# Patient Record
Sex: MALE | Race: ASIAN | NOT HISPANIC OR LATINO | URBAN - METROPOLITAN AREA
[De-identification: names, ages, dates, MRNs, and addresses within clinical notes are randomized per-mention and may not be internally consistent; named-entity substitution may affect disease eponyms.]

---

## 2022-11-02 ENCOUNTER — EMERGENCY (EMERGENCY)
Facility: HOSPITAL | Age: 52
LOS: 0 days | Discharge: HOME | End: 2022-11-02
Attending: EMERGENCY MEDICINE | Admitting: EMERGENCY MEDICINE

## 2022-11-02 VITALS
OXYGEN SATURATION: 95 % | WEIGHT: 184.97 LBS | HEART RATE: 74 BPM | SYSTOLIC BLOOD PRESSURE: 152 MMHG | RESPIRATION RATE: 18 BRPM | TEMPERATURE: 99 F | DIASTOLIC BLOOD PRESSURE: 84 MMHG

## 2022-11-02 DIAGNOSIS — Z23 ENCOUNTER FOR IMMUNIZATION: ICD-10-CM

## 2022-11-02 DIAGNOSIS — S61.411A LACERATION WITHOUT FOREIGN BODY OF RIGHT HAND, INITIAL ENCOUNTER: ICD-10-CM
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
## 2022-11-02 DIAGNOSIS — E11.9 TYPE 2 DIABETES MELLITUS WITHOUT COMPLICATIONS: ICD-10-CM
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
## 2022-11-02 PROCEDURE — 12002 RPR S/N/AX/GEN/TRNK2.6-7.5CM: CPT

## 2022-11-02 PROCEDURE — 99283 EMERGENCY DEPT VISIT LOW MDM: CPT | Mod: 25

## 2022-11-02 RX ORDER — TETANUS TOXOID, REDUCED DIPHTHERIA TOXOID AND ACELLULAR PERTUSSIS VACCINE, ADSORBED 5; 2.5; 8; 8; 2.5 [IU]/.5ML; [IU]/.5ML; UG/.5ML; UG/.5ML; UG/.5ML
0.5 SUSPENSION INTRAMUSCULAR ONCE
Refills: 0 | Status: COMPLETED | OUTPATIENT
Start: 2022-11-02 | End: 2022-11-02

## 2022-11-02 RX ORDER — CEPHALEXIN 500 MG
1 CAPSULE ORAL
Qty: 28 | Refills: 0
Start: 2022-11-02 | End: 2022-11-08

## 2022-11-02 RX ADMIN — TETANUS TOXOID, REDUCED DIPHTHERIA TOXOID AND ACELLULAR PERTUSSIS VACCINE, ADSORBED 0.5 MILLILITER(S): 5; 2.5; 8; 8; 2.5 SUSPENSION INTRAMUSCULAR at 14:54

## 2022-11-02 NOTE — ED PROVIDER NOTE - ATTENDING APP SHARED VISIT CONTRIBUTION OF CARE
51 yo m with pmh of dm, presents with R hand injury.  was at work, cut by porcelain tile.  no difficulty with /strength exam: R hand palmar laceration between 3rd and 4th finger, no tendon involvement, normal sensation, +cap refill, good finger flexion imp: pt with palmar lac, repair, tdap, abx

## 2022-11-02 NOTE — ED PROVIDER NOTE - PATIENT PORTAL LINK FT
You can access the FollowMyHealth Patient Portal offered by [facility] by registering at the following website: [email or web address]. By joining [facility]’s FollowMyHealth portal, you will also be able to view your health information using other applications (apps) compatible with our system.

## 2022-11-02 NOTE — ED ADULT TRIAGE NOTE - WEIGHT IN LBS
----- Message from Ferdinand Doll MD sent at 5/5/2017  3:08 PM CDT -----  Lab orders are in for blood and urine. Please schedule one week before his physical.   184.9

## 2022-11-02 NOTE — ED PROVIDER NOTE - OBJECTIVE STATEMENT
52 year old male presents to the ED with laceration. Laceration occurred at 11:30am to right palm between 3rd and 4th digit 2/2 sharp porcelain material. Patient denies foreign bodies in wound at this time, remains with full strength and sensation in the hand/digits and wound is no longer bleeding. Tetanus is not up to date.

## 2022-11-02 NOTE — ED PROVIDER NOTE - PHYSICAL EXAMINATION
Physical Exam    Constitutional: No acute distress.   Eyes: Conjunctiva pink, Sclera clear, PERRLA, EOMI.  ENT: No sinus tenderness. No nasal discharge. No oropharyngeal erythema, edema, or exudates. Uvula midline.   Cardiovascular: Regular rate, regular rhythm. No noted murmurs rubs or gallops.  Respiratory: unlabored respiratory effort, clear to auscultation bilaterally no wheezing, rales or rhonchi  Gastrointestinal: Normal bowel sounds. soft, non distended, non-tender abdomen.   Musculoskeletal: supple neck, no midline tenderness. No joint or bony deformity.   Integumentary: warm, dry. Laceration right palm 3.5cm in length linear and well approximated. No vascular, tendon or bone structures seen  within wound. No foreign bodies. Bleeding is controlled.   Neurologic: awake, alert, cranial nerves II-XII grossly intact, extremities’ motor and sensory functions grossly intact  Psychiatric: appropriate mood, appropriate affect

## 2022-11-02 NOTE — ED PROVIDER NOTE - NS ED ROS FT
Constitutional: (-) fever  Eyes/ENT: (-) blurry vision, (-) epistaxis  Cardiovascular: (-) chest pain, (-) syncope  Respiratory: (-) cough, (-) shortness of breath  Gastrointestinal: (-) vomiting, (-) diarrhea  Musculoskeletal: (-) neck pain, (-) back pain, (-) joint pain  Integumentary: (-) rash, (-) edema (+) laceration right hand  Neurological: (-) headache, (-) altered mental status  Psychiatric: (-) hallucinations  Allergic/Immunologic: (-) pruritus

## 2022-11-11 ENCOUNTER — EMERGENCY (EMERGENCY)
Facility: HOSPITAL | Age: 52
LOS: 0 days | Discharge: HOME | End: 2022-11-11
Attending: EMERGENCY MEDICINE | Admitting: EMERGENCY MEDICINE

## 2022-11-11 VITALS
HEART RATE: 74 BPM | OXYGEN SATURATION: 100 % | TEMPERATURE: 98 F | SYSTOLIC BLOOD PRESSURE: 150 MMHG | DIASTOLIC BLOOD PRESSURE: 86 MMHG | RESPIRATION RATE: 20 BRPM
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
## 2022-11-11 DIAGNOSIS — S61.411D LACERATION WITHOUT FOREIGN BODY OF RIGHT HAND, SUBSEQUENT ENCOUNTER: ICD-10-CM

## 2022-11-11 PROCEDURE — L9995: CPT

## 2022-11-11 NOTE — ED PROVIDER NOTE - PATIENT PORTAL LINK FT
You can access the FollowMyHealth Patient Portal offered by [facility] by registering at the following website: [email or web address]. By joining [email or web address] [email or web address] portal, you will also be able to view your health information using other applications (apps) compatible with our system.

## 2022-11-11 NOTE — ED PROVIDER NOTE - PHYSICAL EXAMINATION
VITAL SIGNS: I have reviewed nursing notes and confirm.  CONSTITUTIONAL: well-appearing, appropriate for age, non-toxic, NAD  SKIN: + R hand palm between 3rd and 4th digit with linear closed laceration with 5 sutures in place, well-healed, no pus, no surrounding erythema, no crepitus, no signs of infection   HEAD: NCAT  EYES: no conjunctival injection   ENT: Moist mucous membranes  NECK: Supple; non tender.   CARD: RRR, no murmurs, rubs or gallops  RESP: no increased WOB, no tachypnea  EXT: Full ROM to wrist and digits b/l, NVI   NEURO: normal motor. normal sensory.

## 2022-11-11 NOTE — ED PROVIDER NOTE - CLINICAL SUMMARY MEDICAL DECISION MAKING FREE TEXT BOX
No complaints. Prior chart and repair reviewed. Patient n/v intact with Full ROM and full motor strength with no signs of any serious injury. Wound has healed and sutures removed. Small area of wound dehiscence. Wound care discussed in detail. Signs of infection discussed.  All questions and concerns from patient or family addressed. Understanding of instructions verbalized.

## 2022-11-11 NOTE — ED PROVIDER NOTE - ATTENDING CONTRIBUTION TO CARE
I personally evaluated patient. I agree with the findings and plan with all documentation on chart except as documented  in my note.    Patient n/v intact with Full ROM and full motor strength with no signs of any serious injury. Wound has healed and sutures removed. Small area of wound dehiscence. Wound care discussed in detail. Signs of infection discussed.  All questions and concerns from patient or family addressed. Understanding of instructions verbalized.

## 2022-11-11 NOTE — ED PROVIDER NOTE - OBJECTIVE STATEMENT
52 year old male with no PMHx presents to the ED for 5 suture removal placed on 11/2/2022 to R hand. Pt denies pain to site, fever, chills, numbness, weakness, tingling, or any other complaints.

## 2022-11-17 NOTE — ED PROCEDURE NOTE - PROCEDURE ADDITIONAL DETAILS
[As Noted in HPI] : as noted in HPI [Negative] : Head and Neck 1 steri strip applied to distal end of laceration 1 steri strip applied to distal end of laceration for small area of wound dehiscence